# Patient Record
Sex: MALE | Race: ASIAN | NOT HISPANIC OR LATINO
[De-identification: names, ages, dates, MRNs, and addresses within clinical notes are randomized per-mention and may not be internally consistent; named-entity substitution may affect disease eponyms.]

---

## 2020-06-25 ENCOUNTER — APPOINTMENT (OUTPATIENT)
Dept: CARDIOLOGY | Facility: CLINIC | Age: 30
End: 2020-06-25
Payer: MEDICARE

## 2020-06-25 ENCOUNTER — NON-APPOINTMENT (OUTPATIENT)
Age: 30
End: 2020-06-25

## 2020-06-25 VITALS
SYSTOLIC BLOOD PRESSURE: 143 MMHG | WEIGHT: 131 LBS | HEART RATE: 84 BPM | OXYGEN SATURATION: 98 % | TEMPERATURE: 98.6 F | RESPIRATION RATE: 18 BRPM | HEIGHT: 60 IN | BODY MASS INDEX: 25.72 KG/M2 | DIASTOLIC BLOOD PRESSURE: 89 MMHG

## 2020-06-25 DIAGNOSIS — R00.2 PALPITATIONS: ICD-10-CM

## 2020-06-25 DIAGNOSIS — Q90.9 DOWN SYNDROME, UNSPECIFIED: ICD-10-CM

## 2020-06-25 DIAGNOSIS — I27.20 PULMONARY HYPERTENSION, UNSPECIFIED: ICD-10-CM

## 2020-06-25 PROBLEM — Z00.00 ENCOUNTER FOR PREVENTIVE HEALTH EXAMINATION: Status: ACTIVE | Noted: 2020-06-25

## 2020-06-25 PROCEDURE — 93000 ELECTROCARDIOGRAM COMPLETE: CPT

## 2020-06-25 PROCEDURE — 99204 OFFICE O/P NEW MOD 45 MIN: CPT

## 2020-06-25 PROCEDURE — 93306 TTE W/DOPPLER COMPLETE: CPT

## 2020-06-30 NOTE — PHYSICAL EXAM
[Well Groomed] : well groomed [General Appearance - Well Developed] : well developed [Normal Appearance] : normal appearance [General Appearance - Well Nourished] : well nourished [No Deformities] : no deformities [Normal Conjunctiva] : the conjunctiva exhibited no abnormalities [General Appearance - In No Acute Distress] : no acute distress [Eyelids - No Xanthelasma] : the eyelids demonstrated no xanthelasmas [No Oral Pallor] : no oral pallor [Normal Oral Mucosa] : normal oral mucosa [Normal Jugular Venous V Waves Present] : normal jugular venous V waves present [Normal Jugular Venous A Waves Present] : normal jugular venous A waves present [No Oral Cyanosis] : no oral cyanosis [No Jugular Venous Harvey A Waves] : no jugular venous harvey A waves [Heart Rate And Rhythm] : heart rate and rhythm were normal [Murmurs] : no murmurs present [Arterial Pulses Normal] : the arterial pulses were normal [Respiration, Rhythm And Depth] : normal respiratory rhythm and effort [Exaggerated Use Of Accessory Muscles For Inspiration] : no accessory muscle use [Abdomen Soft] : soft [Abdomen Tenderness] : non-tender [Auscultation Breath Sounds / Voice Sounds] : lungs were clear to auscultation bilaterally [Abdomen Mass (___ Cm)] : no abdominal mass palpated [Abnormal Walk] : normal gait [Gait - Sufficient For Exercise Testing] : the gait was sufficient for exercise testing [Nail Clubbing] : no clubbing of the fingernails [Cyanosis, Localized] : no localized cyanosis [Petechial Hemorrhages (___cm)] : no petechial hemorrhages [] : no ischemic changes [Oriented To Time, Place, And Person] : oriented to person, place, and time [Affect] : the affect was normal [Mood] : the mood was normal [No Anxiety] : not feeling anxious [FreeTextEntry1] : Split S2; trace edema

## 2020-06-30 NOTE — HISTORY OF PRESENT ILLNESS
[FreeTextEntry1] : 29 year-old male with Down syndrome and AV canal defect s/p repair at age of 20 months at [facility] by Dr. Christensen, s/p stroke at age 4 due to possible endocarditis, pulmonary hypertension, presents for cardiac evaluation.  Mom reports that patient underwent pulmonary artery banding at age of 2 months.  At age of 20 months, he underwent definitive AV canal repair with patch.  At aget 4, while travelling in [place], he developed pneumonia complicated by stroke from possible endocarditis.  Patient made a good recovery from stroke.  Mom reports that he has been more SOB recently, and has been easily tired.  Mom also reports that he experiences episodes palpitations, described as fast heartbeats, lasting minutes.  He had syncopal episodes 3-4 years, two of which were when he was using bathroom.  He also has recent episodes of vertigo.  Mom reports recent left facial droop.  Mom reports that he has difficulty swallowing and eats pureed food.  He has a lot of mucus.  He can't drink water.  He drinks drink thickened with rice water.

## 2020-06-30 NOTE — DISCUSSION/SUMMARY
[FreeTextEntry1] : 29 year-old male with Down syndrome and AV canal defect s/p repair at age of 20 months at [facility] by Dr. Christensen, s/p stroke at age 4 due to possible endocarditis, pulmonary hypertension, presents for cardiac evaluation.  Mom reports that patient underwent pulmonary artery banding at age of 2 months.  At age of 20 months, he underwent definitive AV canal repair with patch.  At aget 4, while travelling in [place], he developed pneumonia complicated by stroke from possible endocarditis.  Patient made a good recovery from stroke.  Mom reports that he has been more SOB recently, and has been easily tired.  Mom also reports that he experiences episodes palpitations, described as fast heartbeats, lasting minutes.  He had syncopal episodes 3-4 years, two of which were when he was using bathroom.  He also has recent episodes of vertigo.  Mom reports recent left facial droop.  Mom reports that he has difficulty swallowing and eats pureed food.  He has a lot of mucus.  He can't drink water.  He drinks drink thickened with rice water.

## 2020-07-01 ENCOUNTER — NON-APPOINTMENT (OUTPATIENT)
Age: 30
End: 2020-07-01

## 2022-01-01 ENCOUNTER — NON-APPOINTMENT (OUTPATIENT)
Age: 32
End: 2022-01-01

## 2022-01-01 ENCOUNTER — APPOINTMENT (OUTPATIENT)
Dept: SURGERY | Facility: CLINIC | Age: 32
End: 2022-01-01
Payer: MEDICARE

## 2022-01-01 ENCOUNTER — APPOINTMENT (OUTPATIENT)
Dept: PULMONOLOGY | Facility: CLINIC | Age: 32
End: 2022-01-01

## 2022-01-01 ENCOUNTER — APPOINTMENT (OUTPATIENT)
Dept: SURGICAL ONCOLOGY | Facility: CLINIC | Age: 32
End: 2022-01-01

## 2022-01-01 ENCOUNTER — APPOINTMENT (OUTPATIENT)
Dept: CARDIOLOGY | Facility: CLINIC | Age: 32
End: 2022-01-01
Payer: MEDICARE

## 2022-01-01 ENCOUNTER — APPOINTMENT (OUTPATIENT)
Dept: CARDIOLOGY | Facility: CLINIC | Age: 32
End: 2022-01-01

## 2022-01-01 VITALS
OXYGEN SATURATION: 96 % | TEMPERATURE: 97.6 F | DIASTOLIC BLOOD PRESSURE: 82 MMHG | HEART RATE: 95 BPM | BODY MASS INDEX: 30.08 KG/M2 | RESPIRATION RATE: 18 BRPM | WEIGHT: 139 LBS | SYSTOLIC BLOOD PRESSURE: 152 MMHG

## 2022-01-01 VITALS
HEART RATE: 88 BPM | OXYGEN SATURATION: 98 % | TEMPERATURE: 99.5 F | SYSTOLIC BLOOD PRESSURE: 151 MMHG | RESPIRATION RATE: 18 BRPM | BODY MASS INDEX: 29.86 KG/M2 | DIASTOLIC BLOOD PRESSURE: 90 MMHG | WEIGHT: 138 LBS

## 2022-01-01 VITALS
DIASTOLIC BLOOD PRESSURE: 80 MMHG | TEMPERATURE: 97.1 F | RESPIRATION RATE: 18 BRPM | WEIGHT: 144 LBS | OXYGEN SATURATION: 97 % | BODY MASS INDEX: 31.16 KG/M2 | HEART RATE: 95 BPM | SYSTOLIC BLOOD PRESSURE: 152 MMHG

## 2022-01-01 DIAGNOSIS — Q21.2 ATRIOVENTRICULAR SEPTAL DEFECT: ICD-10-CM

## 2022-01-01 DIAGNOSIS — Q24.9 CONGENITAL MALFORMATION OF HEART, UNSPECIFIED: ICD-10-CM

## 2022-01-01 DIAGNOSIS — R10.9 UNSPECIFIED ABDOMINAL PAIN: ICD-10-CM

## 2022-01-01 DIAGNOSIS — I10 ESSENTIAL (PRIMARY) HYPERTENSION: ICD-10-CM

## 2022-01-01 DIAGNOSIS — I34.0 NONRHEUMATIC MITRAL (VALVE) INSUFFICIENCY: ICD-10-CM

## 2022-01-01 DIAGNOSIS — R06.83 SNORING: ICD-10-CM

## 2022-01-01 DIAGNOSIS — R06.02 SHORTNESS OF BREATH: ICD-10-CM

## 2022-01-01 DIAGNOSIS — K43.9 VENTRAL HERNIA W/OUT OBSTRUCTION OR GANGRENE: ICD-10-CM

## 2022-01-01 PROCEDURE — 99214 OFFICE O/P EST MOD 30 MIN: CPT | Mod: 25

## 2022-01-01 PROCEDURE — 93000 ELECTROCARDIOGRAM COMPLETE: CPT

## 2022-01-01 PROCEDURE — 93306 TTE W/DOPPLER COMPLETE: CPT

## 2022-01-01 PROCEDURE — 99214 OFFICE O/P EST MOD 30 MIN: CPT

## 2022-01-01 PROCEDURE — 99203 OFFICE O/P NEW LOW 30 MIN: CPT

## 2022-01-01 RX ORDER — LOSARTAN POTASSIUM 50 MG/1
50 TABLET, FILM COATED ORAL DAILY
Qty: 30 | Refills: 5 | Status: ACTIVE | COMMUNITY
Start: 2022-01-01 | End: 1900-01-01

## 2022-01-01 RX ORDER — AMLODIPINE BESYLATE 5 MG/1
5 TABLET ORAL
Qty: 90 | Refills: 3 | Status: ACTIVE | COMMUNITY
Start: 1900-01-01 | End: 1900-01-01

## 2022-04-19 PROBLEM — R06.02 SOB (SHORTNESS OF BREATH): Status: ACTIVE | Noted: 2020-06-25

## 2022-04-21 PROBLEM — R10.9 ABDOMINAL PAIN: Status: ACTIVE | Noted: 2022-01-01

## 2022-04-21 PROBLEM — R06.83 SNORING: Status: ACTIVE | Noted: 2022-01-01

## 2022-04-25 NOTE — REASON FOR VISIT
[FreeTextEntry1] : 30 year-old male with Down syndrome and AV canal defect s/p repair at age of 20 months at [facility] by Dr. Christensen, s/p stroke at age 4 due to possible endocarditis, pulmonary hypertension, presents for followup.  \par \par Patient was last seen on 6/25/20 for cardiac evaluation.  Patient underwent an echocardiogram and it showed normal LV function without significant valvular pathology.  No intracardiac shunt noted.  Patient wore a Holter and it showed rare APC's, occasional PVC's, without significant arrhythmia.

## 2022-04-25 NOTE — HISTORY OF PRESENT ILLNESS
[FreeTextEntry1] : 4/21/22- Patient reports SOB with panting and when lying down. Patient has gained weight during this pandemic. Patient is slower to response to questions. Patient does snores. Patient had bad choking episode and had CXR last year. Patient's mom reports that patient is very tired. Patient is on Amlodipine 5 mg, Losartan 25 mg, I advised patient to see pulmonologist for sleep apnea. Patient has a fear of falling and has to hold mother's hand. Patient may have a hernia. I advised patient to see surgeon.  advised patient to undergo an echocardiogram.  \par \par 6/25/20 - Mom reports that patient underwent pulmonary artery banding at age of 2 months.  At age of 20 months, he underwent definitive AV canal repair with patch.  At ag[facility] 4, while travelling in [place], he developed pneumonia complicated by stroke from possible endocarditis.  Patient made a good recovery from stroke.  Mom reports that he has been more SOB recently, and has been easily tired.  Mom also reports that he experiences episodes palpitations, described as fast heartbeats, lasting minutes.  He had syncopal episodes 3-4 years, two of which were when he was using bathroom.  He also has recent episodes of vertigo.  Mom reports recent left facial droop.  Mom reports that he has difficulty swallowing and eats pureed food.  He has a lot of mucus.  He can't drink water.  He drinks drink thickened with rice water.

## 2022-04-25 NOTE — PHYSICAL EXAM
[General Appearance - Well Developed] : well developed [Normal Appearance] : normal appearance [Well Groomed] : well groomed [General Appearance - Well Nourished] : well nourished [No Deformities] : no deformities [General Appearance - In No Acute Distress] : no acute distress [Normal Conjunctiva] : the conjunctiva exhibited no abnormalities [Eyelids - No Xanthelasma] : the eyelids demonstrated no xanthelasmas [Normal Oral Mucosa] : normal oral mucosa [No Oral Pallor] : no oral pallor [No Oral Cyanosis] : no oral cyanosis [Normal Jugular Venous A Waves Present] : normal jugular venous A waves present [Normal Jugular Venous V Waves Present] : normal jugular venous V waves present [No Jugular Venous Harvey A Waves] : no jugular venous harvey A waves [Murmurs] : no murmurs present [Heart Rate And Rhythm] : heart rate and rhythm were normal [Arterial Pulses Normal] : the arterial pulses were normal [Respiration, Rhythm And Depth] : normal respiratory rhythm and effort [Exaggerated Use Of Accessory Muscles For Inspiration] : no accessory muscle use [Auscultation Breath Sounds / Voice Sounds] : lungs were clear to auscultation bilaterally [Abdomen Tenderness] : non-tender [Abdomen Soft] : soft [Abdomen Mass (___ Cm)] : no abdominal mass palpated [Abnormal Walk] : normal gait [Gait - Sufficient For Exercise Testing] : the gait was sufficient for exercise testing [Nail Clubbing] : no clubbing of the fingernails [Cyanosis, Localized] : no localized cyanosis [Petechial Hemorrhages (___cm)] : no petechial hemorrhages [] : no ischemic changes [Affect] : the affect was normal [Oriented To Time, Place, And Person] : oriented to person, place, and time [Mood] : the mood was normal [No Anxiety] : not feeling anxious [FreeTextEntry1] : Split S2; trace edema

## 2022-04-25 NOTE — DISCUSSION/SUMMARY
[FreeTextEntry1] : 29 year-old male with Down syndrome and AV canal defect s/p repair at age of 20 months at [facility] by Dr. Christensen, s/p stroke at age 4 due to possible endocarditis, pulmonary hypertension, presents for cardiac evaluation.  Mom reports that patient underwent pulmonary artery banding at age of 2 months.  At age of 20 months, he underwent definitive AV canal repair with patch.  At aget 4, while travelling in [place], he developed pneumonia complicated by stroke from possible endocarditis.  Patient made a good recovery from stroke.  Mom reports that he has been more SOB recently, and has been easily tired.  Mom also reports that he experiences episodes palpitations, described as fast heartbeats, lasting minutes.  He had syncopal episodes 3-4 years, two of which were when he was using bathroom.  He also has recent episodes of vertigo.  Mom reports recent left facial droop.  Mom reports that he has difficulty swallowing and eats pureed food.  He has a lot of mucus.  He can't drink water.  He drinks drink thickened with rice water.

## 2022-06-13 PROBLEM — K43.9 VENTRAL HERNIA: Status: ACTIVE | Noted: 2022-01-01

## 2022-06-13 NOTE — HISTORY OF PRESENT ILLNESS
[de-identified] : 31 year old man with a history of Down syndrome, AV canal defect s/p repair; s/p stroke, pulm HTN, presents to the office for concerns of a possible abdominal wall hernia.\par Per patient and mother: first noticed the bulge in the right dominic-abdomen a few weeks ago. Mom states that they noticed it in the shower; and the patient does not like the way it looks. \par Denies any nausea, vomiting ,fever or chills. Tolerating PO intake with normal GI and  function. \par Through diet and exercise, has been able to lose 5 pounds over the last month. \par

## 2022-06-13 NOTE — PLAN
[FreeTextEntry1] : 31 year old man with concerns for ?ventral hernia\par - will obtain CT abd/pelvis for evaluation

## 2022-06-13 NOTE — PHYSICAL EXAM
[No Rash or Lesion] : No rash or lesion [Alert] : alert [Calm] : calm [de-identified] : comfortable, well appearing [de-identified] : breathing comfortably on room air, no cough [de-identified] : soft, not tender and not distended\par Bulge over the right hemiabdomen; superior to umbilicus off midline. \par Unable to palpate defect edges.

## 2022-07-26 PROBLEM — I34.0 MITRAL REGURGITATION: Status: ACTIVE | Noted: 2022-01-01

## 2022-07-26 PROBLEM — Q24.9 CONGENITAL HEART DISEASE: Status: ACTIVE | Noted: 2020-06-25

## 2022-07-26 PROBLEM — Q21.2 ATRIOVENTRICULAR CANAL DEFECT: Status: ACTIVE | Noted: 2020-06-25

## 2022-07-26 NOTE — REASON FOR VISIT
[FreeTextEntry1] : 30 year-old male with Down syndrome and AV canal defect s/p repair at age of 20 months at [facility] by Dr. Christensen, s/p stroke at age 4 due to possible endocarditis, pulmonary hypertension, presents for followup.  \par \par Patient was last seen on 4/21/22 for SOB.  I advised patient to undergo an echocardiogram.  Patient underwent an echocardiogram and it showed normal LV function with moderate MR.  No intracardiac shunt noted.  Patient wore a Holter and it showed average HR 72, 3.82% PVCs, couplets, triplet, bigeminy and trigeminy.  I advised patient to see pulmonologist for sleep apnea.  Patient may have a hernia.  I advised patient to see surgeon. \par \par Patient is on Amlodipine 5 mg, Losartan 25 mg,

## 2022-07-26 NOTE — PHYSICAL EXAM
[General Appearance - Well Developed] : well developed [Normal Appearance] : normal appearance [Well Groomed] : well groomed [General Appearance - Well Nourished] : well nourished [No Deformities] : no deformities [General Appearance - In No Acute Distress] : no acute distress [Normal Conjunctiva] : the conjunctiva exhibited no abnormalities [Eyelids - No Xanthelasma] : the eyelids demonstrated no xanthelasmas [Normal Oral Mucosa] : normal oral mucosa [No Oral Pallor] : no oral pallor [No Oral Cyanosis] : no oral cyanosis [Normal Jugular Venous A Waves Present] : normal jugular venous A waves present [Normal Jugular Venous V Waves Present] : normal jugular venous V waves present [No Jugular Venous Harvey A Waves] : no jugular venous harvey A waves [Heart Rate And Rhythm] : heart rate and rhythm were normal [Murmurs] : no murmurs present [Arterial Pulses Normal] : the arterial pulses were normal [FreeTextEntry1] : Split S2; trace edema [Respiration, Rhythm And Depth] : normal respiratory rhythm and effort [Exaggerated Use Of Accessory Muscles For Inspiration] : no accessory muscle use [Auscultation Breath Sounds / Voice Sounds] : lungs were clear to auscultation bilaterally [Abdomen Soft] : soft [Abdomen Tenderness] : non-tender [Abdomen Mass (___ Cm)] : no abdominal mass palpated [Abnormal Walk] : normal gait [Gait - Sufficient For Exercise Testing] : the gait was sufficient for exercise testing [Nail Clubbing] : no clubbing of the fingernails [Cyanosis, Localized] : no localized cyanosis [Petechial Hemorrhages (___cm)] : no petechial hemorrhages [] : no ischemic changes [Oriented To Time, Place, And Person] : oriented to person, place, and time [Affect] : the affect was normal [Mood] : the mood was normal [No Anxiety] : not feeling anxious

## 2022-07-26 NOTE — HISTORY OF PRESENT ILLNESS
[FreeTextEntry1] : 4/21/22- Patient reports SOB with panting and when lying down. Patient has gained weight during this pandemic. Patient is slower to respond to questions. Patient does snores. Patient had bad choking episode and had CXR last year. Patient's mom reports that patient is very tired. Patient is on Amlodipine 5 mg, Losartan 25 mg, I advised patient to see pulmonologist for sleep apnea. Patient has a fear of falling and has to hold mother's hand. Patient may have a hernia. I advised patient to see surgeon.  advised patient to undergo an echocardiogram.  Patient underwent an echocardiogram and it showed normal LV function with moderate MR.  No intracardiac shunt noted.\par \par 6/25/20 - Mom reports that patient underwent pulmonary artery banding at age of 2 months.  At age of 20 months, he underwent definitive AV canal repair with patch.  At age 4, while travelling in [place], he developed pneumonia complicated by stroke from possible endocarditis.  Patient made a good recovery from stroke.  Mom reports that he has been more SOB recently, and has been easily tired.  Mom also reports that he experiences episodes palpitations, described as fast heartbeats, lasting minutes.  He had syncopal episodes 3-4 years, two of which were when he was using bathroom.  He also has recent episodes of vertigo.  Mom reports recent left facial droop.  Mom reports that he has difficulty swallowing and eats pureed food.  He has a lot of mucus.  He can't drink water.  He drinks drink thickened with rice water.

## 2022-07-27 PROBLEM — I10 HTN (HYPERTENSION): Status: ACTIVE | Noted: 2022-01-01

## 2022-10-26 ENCOUNTER — APPOINTMENT (OUTPATIENT)
Dept: CARDIOLOGY | Facility: CLINIC | Age: 32
End: 2022-10-26